# Patient Record
Sex: FEMALE | Race: NATIVE HAWAIIAN OR OTHER PACIFIC ISLANDER | URBAN - METROPOLITAN AREA
[De-identification: names, ages, dates, MRNs, and addresses within clinical notes are randomized per-mention and may not be internally consistent; named-entity substitution may affect disease eponyms.]

---

## 2023-04-03 ENCOUNTER — TELEPHONE (OUTPATIENT)
Dept: PEDIATRICS | Facility: CLINIC | Age: 23
End: 2023-04-03

## 2023-04-03 NOTE — TELEPHONE ENCOUNTER
[facility]          Patient Name: Jolene Ambrosio  /Age:  2000 (22 year old)      Intervention: LVM and mailed letter to schedule ASD3 evaluation from the wait list      Status on Wait List: active until 2023      Plan: Schedule next available ASD3 evaluation (1 ASD3 scheduled per week) and add to the patient wait list for paperwork          Nafisa Kulkarni, Senior     [facility]